# Patient Record
Sex: MALE | Race: BLACK OR AFRICAN AMERICAN | NOT HISPANIC OR LATINO | URBAN - METROPOLITAN AREA
[De-identification: names, ages, dates, MRNs, and addresses within clinical notes are randomized per-mention and may not be internally consistent; named-entity substitution may affect disease eponyms.]

---

## 2023-01-19 ENCOUNTER — EMERGENCY (EMERGENCY)
Facility: HOSPITAL | Age: 28
LOS: 1 days | Discharge: HOME | End: 2023-01-19
Attending: PERSONAL EMERGENCY RESPONSE ATTENDANT
Payer: COMMERCIAL

## 2023-01-19 VITALS
WEIGHT: 195.11 LBS | SYSTOLIC BLOOD PRESSURE: 124 MMHG | HEART RATE: 66 BPM | HEIGHT: 74 IN | TEMPERATURE: 98 F | RESPIRATION RATE: 20 BRPM | DIASTOLIC BLOOD PRESSURE: 81 MMHG | OXYGEN SATURATION: 100 %

## 2023-01-19 PROCEDURE — 71045 X-RAY EXAM CHEST 1 VIEW: CPT

## 2023-01-19 PROCEDURE — 72125 CT NECK SPINE W/O DYE: CPT | Mod: MA

## 2023-01-19 PROCEDURE — 72125 CT NECK SPINE W/O DYE: CPT | Mod: 26,MA

## 2023-01-19 PROCEDURE — 99284 EMERGENCY DEPT VISIT MOD MDM: CPT

## 2023-01-19 PROCEDURE — 71045 X-RAY EXAM CHEST 1 VIEW: CPT | Mod: 26

## 2023-01-19 PROCEDURE — 99284 EMERGENCY DEPT VISIT MOD MDM: CPT | Mod: 25

## 2023-01-19 RX ORDER — ACETAMINOPHEN 500 MG
650 TABLET ORAL ONCE
Refills: 0 | Status: COMPLETED | OUTPATIENT
Start: 2023-01-19 | End: 2023-01-19

## 2023-01-19 RX ORDER — IBUPROFEN 200 MG
400 TABLET ORAL ONCE
Refills: 0 | Status: COMPLETED | OUTPATIENT
Start: 2023-01-19 | End: 2023-01-19

## 2023-01-19 RX ADMIN — Medication 400 MILLIGRAM(S): at 18:16

## 2023-01-19 RX ADMIN — Medication 650 MILLIGRAM(S): at 18:08
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
## 2023-01-19 NOTE — ED ADULT TRIAGE NOTE - WEIGHT METHOD
Your work-up was as discussed.  Your CT did show stool in your colon however no fecal impaction.  There was also pulmonary nodules noted on the left side, hiatal hernia, and gallstones.  As discussed, please make dietary changes-increase hydration, increase fiber, and increase vegetables to help with your constipation.  I have sent a prescription of MiraLAX and you may also tried magnesium citrate over the counter.  Please follow-up closely with your PCP.  Please return to the emergency department for any new or worsening symptoms-abdominal pain, fevers, nausea, vomiting, chest pain or shortness of  
stated
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
## 2023-01-19 NOTE — ED PROVIDER NOTE - PROGRESS NOTE DETAILS
Attending MD Alvarez.  PT signed out to me in stable condition pending imaging, reassessment. Eyad Patricia MD, PGY-1: CT scan shows a small avulsion fracture of the C7 spinous process.  This is a stable fracture and patient can be discharged with close spine follow-up.

## 2023-01-19 NOTE — ED PROVIDER NOTE - CROS ED NEURO POS
HEADACHE Xelyuniorz Pregnancy And Lactation Text: This medication is Pregnancy Category D and is not considered safe during pregnancy.  The risk during breast feeding is also uncertain.

## 2023-01-19 NOTE — ED PROVIDER NOTE - NSFOLLOWUPCLINICS_GEN_ALL_ED_FT
[facility] - [facility]  Ortho/Spine  [address] [place] [address]  Phone: [phone]  Fax:
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
## 2023-01-19 NOTE — ED PROVIDER NOTE - CARE PLAN
1 Principal Discharge DX:	Leg pain   Principal Discharge DX:	Fracture of spinous process of cervical vertebra
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
## 2023-01-19 NOTE — ED PROVIDER NOTE - PATIENT PORTAL LINK FT
You can access the FollowMyHealth Patient Portal offered by [facility] by registering at the following website: [email or web address]. By joining [employer]’s FollowMyHealth portal, you will also be able to view your health information using other applications (apps) compatible with our system.

## 2023-01-19 NOTE — ED PROVIDER NOTE - ATTENDING CONTRIBUTION TO CARE
Attending MD Alvarez:  I performed a history and physical exam of the patient and discussed their management with the resident. I reviewed the resident's note and agree with the documented findings and plan of care. My medical decision making and observations are found above.

## 2023-01-19 NOTE — ED PROVIDER NOTE - PHYSICAL EXAMINATION
General: well appearing, alert, oriented to person, time, place  Psych: mood appropriate  Head: normocephalic; atraumatic  Eyes: PERRLA, EOMI, conjunctivae clear bilaterally, sclerae anicteric  ENT: no nasal flaring, patent nares  Cardio: RRR, no m/r/g, pulses 2+ b/l  Resp: CATB, no w/r/r  GI: soft/nondistended/nontender  : no CVA tenderness  Neuro: normal sensation, moving all four extremities equally  Skin: No evidence of rash or bruising  MSK: Midline tenderness to palpation over cervical spine; TTP over left chest; normal sensation in extremities  Lymph/Vasc: no LE edema
